# Patient Record
Sex: MALE | Race: WHITE | HISPANIC OR LATINO | Employment: FULL TIME | URBAN - METROPOLITAN AREA
[De-identification: names, ages, dates, MRNs, and addresses within clinical notes are randomized per-mention and may not be internally consistent; named-entity substitution may affect disease eponyms.]

---

## 2017-02-14 ENCOUNTER — NON-PROVIDER VISIT (OUTPATIENT)
Dept: OCCUPATIONAL MEDICINE | Facility: CLINIC | Age: 27
End: 2017-02-14
Payer: COMMERCIAL

## 2017-02-14 ENCOUNTER — HOSPITAL ENCOUNTER (EMERGENCY)
Facility: MEDICAL CENTER | Age: 27
End: 2017-02-14
Attending: EMERGENCY MEDICINE
Payer: COMMERCIAL

## 2017-02-14 ENCOUNTER — APPOINTMENT (OUTPATIENT)
Dept: RADIOLOGY | Facility: MEDICAL CENTER | Age: 27
End: 2017-02-14
Attending: EMERGENCY MEDICINE
Payer: COMMERCIAL

## 2017-02-14 VITALS
WEIGHT: 181.88 LBS | BODY MASS INDEX: 27.57 KG/M2 | TEMPERATURE: 98.2 F | DIASTOLIC BLOOD PRESSURE: 90 MMHG | HEART RATE: 75 BPM | OXYGEN SATURATION: 98 % | HEIGHT: 68 IN | RESPIRATION RATE: 18 BRPM | SYSTOLIC BLOOD PRESSURE: 135 MMHG

## 2017-02-14 DIAGNOSIS — Z02.83 ENCOUNTER FOR DRUG SCREENING: ICD-10-CM

## 2017-02-14 DIAGNOSIS — S62.635A CLOSED DISPLACED FRACTURE OF DISTAL PHALANX OF LEFT RING FINGER, INITIAL ENCOUNTER: ICD-10-CM

## 2017-02-14 DIAGNOSIS — Z02.1 PRE-EMPLOYMENT DRUG SCREENING: ICD-10-CM

## 2017-02-14 PROCEDURE — 73140 X-RAY EXAM OF FINGER(S): CPT | Mod: LT

## 2017-02-14 PROCEDURE — 99026 IN-HOSPITAL ON CALL SERVICE: CPT | Performed by: INTERNAL MEDICINE

## 2017-02-14 PROCEDURE — 99283 EMERGENCY DEPT VISIT LOW MDM: CPT

## 2017-02-14 PROCEDURE — 700102 HCHG RX REV CODE 250 W/ 637 OVERRIDE(OP): Performed by: EMERGENCY MEDICINE

## 2017-02-14 PROCEDURE — A9270 NON-COVERED ITEM OR SERVICE: HCPCS | Performed by: EMERGENCY MEDICINE

## 2017-02-14 PROCEDURE — 80305 DRUG TEST PRSMV DIR OPT OBS: CPT | Performed by: INTERNAL MEDICINE

## 2017-02-14 RX ORDER — OXYCODONE AND ACETAMINOPHEN 10; 325 MG/1; MG/1
1 TABLET ORAL EVERY 4 HOURS PRN
Qty: 20 TAB | Refills: 0 | Status: SHIPPED | OUTPATIENT
Start: 2017-02-14 | End: 2019-06-23

## 2017-02-14 RX ORDER — OXYCODONE AND ACETAMINOPHEN 10; 325 MG/1; MG/1
1 TABLET ORAL ONCE
Status: COMPLETED | OUTPATIENT
Start: 2017-02-14 | End: 2017-02-14

## 2017-02-14 RX ADMIN — OXYCODONE HYDROCHLORIDE AND ACETAMINOPHEN 1 TABLET: 10; 325 TABLET ORAL at 17:39

## 2017-02-14 ASSESSMENT — PAIN SCALES - WONG BAKER: WONGBAKER_NUMERICALRESPONSE: HURTS EVEN MORE

## 2017-02-14 ASSESSMENT — PAIN SCALES - GENERAL: PAINLEVEL_OUTOF10: 6

## 2017-02-14 NOTE — ED AVS SNAPSHOT
Home Care Instructions                                                                                                                Abdulkadir Jorge   MRN: [id number]    Department:  [facility], Emergency Dept   Date of Visit:  2/14/2017            [facility], Emergency Dept    [address]    [place] [address]    Phone:  [phone]      You were seen by     Guy G Gansert, M.D.      Your Diagnosis Was     Closed displaced fracture of distal phalanx of left ring finger, initial encounter     S62.635A       These are the medications you received during your hospitalization from 02/14/2017 1629 to 02/14/2017 1817     Date/Time Order Dose Route Action    02/14/2017 1739 oxycodone-acetaminophen (PERCOCET-10)  MG per tablet 1 Tab 1 Tab Oral Given      Follow-up Information     1. Follow up with [facility] In 1 day.    Contact information    [address] [place] [address] [phone]          2. Follow up with Josh Cardenas M.D..    Specialty:  Orthopaedics    Contact information    [address]  [address]  [place] [address] [phone]        Medication Information     Review all of your home medications and newly ordered medications with your primary doctor and/or pharmacist as soon as possible. Follow medication instructions as directed by your doctor and/or pharmacist.     Please keep your complete medication list with you and share with your physician. Update the information when medications are discontinued, doses are changed, or new medications (including over-the-counter products) are added; and carry medication information at all times in the event of emergency situations.               Medication List      START taking these medications        Instructions    oxycodone-acetaminophen  MG Tabs   Commonly known as:  PERCOCET-10    Take 1 Tab by mouth every four hours as needed for Severe Pain (pain).   Dose:  1 Tab                 Procedures and tests performed during your visit     DX-FINGER(S) 2+ LEFT        Discharge Instructions       Finger Fracture  Fractures of fingers are breaks in the bones of the fingers. There are many types of fractures. There are different ways of treating these fractures. Your health care provider will discuss the best way to treat your fracture.  CAUSES  Traumatic injury is the main cause of broken fingers. These include:  · Injuries while playing sports.  · Workplace injuries.  · Falls.  RISK FACTORS  Activities that can increase your risk of finger fractures include:  · Sports.  · Workplace activities that involve machinery.  · A condition called osteoporosis, which can make your bones less dense and cause them to fracture more easily.  SIGNS AND SYMPTOMS  The main symptoms of a broken finger are pain and swelling within 15 minutes after the injury. Other symptoms include:  · Bruising of your finger.  · Stiffness of your finger.  · Numbness of your finger.  · Exposed bones (compound fracture) if the fracture is severe.  DIAGNOSIS   The best way to diagnose a broken bone is with X-ray imaging. Additionally, your health care provider will use this X-ray image to evaluate the position of the broken finger bones.   TREATMENT   Finger fractures can be treated with:   · Nonreduction--This means the bones are in place. The finger is splinted without changing the positions of the bone pieces. The splint is usually left on for about a week to 10 days. This will depend on your fracture and what your health care provider thinks.  · Closed reduction--The bones are put back into position without using surgery. The finger is then splinted.  · Open reduction and internal fixation--The fracture site is opened. Then the bone pieces are fixed into place with pins or some type of hardware. This is seldom required. It depends on the severity of the fracture.  HOME CARE INSTRUCTIONS   · Follow your health care  provider's instructions regarding activities, exercises, and physical therapy.  · Only take over-the-counter or prescription medicines for pain, discomfort, or fever as directed by your health care provider.  SEEK MEDICAL CARE IF:  You have pain or swelling that limits the motion or use of your fingers.  SEEK IMMEDIATE MEDICAL CARE IF:   Your finger becomes numb.  MAKE SURE YOU:   · Understand these instructions.  · Will watch your condition.  · Will get help right away if you are not doing well or get worse.     This information is not intended to replace advice given to you by your health care provider. Make sure you discuss any questions you have with your health care provider.     Document Released: 04/01/2002 Document Revised: 10/08/2014 Document Reviewed: 07/30/2014  [employer] Interactive Patient Education ©2016 [employer]            Patient Information     Patient Information    Following emergency treatment: all patient requiring follow-up care must return either to a private physician or a clinic if your condition worsens before you are able to obtain further medical attention, please return to the emergency room.     Billing Information    At [facility], we work to make the billing process streamlined for our patients.  Our Representatives are here to answer any questions you may have regarding your hospital bill.  If you have insurance coverage and have supplied your insurance information to us, we will submit a claim to your insurer on your behalf.  Should you have any questions regarding your bill, we can be reached online or by phone as follows:  Online: You are able pay your bills online or live chat with our representatives about any billing questions you may have. We are here to help Monday - Friday from 8:00am to 7:30pm and 9:00am - 12:00pm on Saturdays.  Please visit [email or web address]  for more information.   Phone:  [phone] or [phone]    Please  note that your emergency physician, surgeon, pathologist, radiologist, anesthesiologist, and other specialists are not employed by [facility] and will therefore bill separately for their services.  Please contact them directly for any questions concerning their bills at the numbers below:     Emergency Physician Services:  [phone]  [facility]:  [phone]  [facility]:  [phone]  [facility]:  [phone]    1. Your final bill may vary from the amount quoted upon discharge if all procedures are not complete at that time, or if your doctor has additional procedures of which we are not aware. You will receive an additional bill if you return to the Emergency Department at [facility] for suture removal regardless of the facility of which the sutures were placed.     2. Please arrange for settlement of this account at the emergency registration.    3. All self-pay accounts are due in full at the time of treatment.  If you are unable to meet this obligation then payment is expected within 4-5 days.     4. If you have had radiology studies (CT, X-ray, Ultrasound, MRI), you have received a preliminary result during your emergency department visit. Please contact the radiology department [phone] to receive a copy of your final result. Please discuss the Final result with your primary physician or with the follow up physician provided.     Crisis Hotline:  [place] Crisis Hotline:  2-123-FTEMRCQ or 1-596.561.1601  [place] Crisis Hotline:    1-662.250.9005 or 830-195-6211         ED Discharge Follow Up Questions    1. In order to provide you with very good care, we would like to follow up with a phone call in the next few days.  May we have your permission to contact you?     YES /  NO    2. What is the best phone number to call you? (       )_____-__________    3. What is the best time to call you?      Morning  /  Afternoon  /  Evening                    Patient Signature:  ____________________________________________________________    Date:  ____________________________________________________________

## 2017-02-14 NOTE — LETTER
"  FORM C-4:  EMPLOYEE’S CLAIM FOR COMPENSATION/ REPORT OF INITIAL TREATMENT  EMPLOYEE’S CLAIM - PROVIDE ALL INFORMATION REQUESTED   First Name  Abdulkadir Last Name  Luisito Birthdate             Age  1990 27 y.o. Sex  male Claim Number   Home Employee Address  [address]  [place]                                     Zip  [address] Height  1.727 m (5' 8\") Weight  82.5 kg (181 lb 14.1 oz) [facility]     Mailing Employee Address                           [address]   [place]               Zip  [address] Telephone  [phone] (home)  Primary Language Spoken  ENGLISH   Insurer  N/L Third Party   [employer] Employee's Occupation (Job Title) When Injury or Occupational Disease Occurred     Employer's Name  [employer] Telephone  [phone]    Employer Address  [address] [address] [facility] [facility] [address]] Zip  [address]   Date of Injury  2/14/2017       Hour of Injury  2:00 PM Date Employer Notified  2/14/2017 Last Day of Work after Injury or Occupational Disease  2/14/2017 Supervisor to Whom Injury Reported  Clint   Address or Location of Accident (if applicable)  [[employer] [facility]]   What were you doing at the time of accident? (if applicable)  nailing wood    How did this injury or occupational disease occur? Be specific and answer in detail. Use additional sheet if necessary)  I was nailing a hold down. i tried to nail the hammer slopped with the nail. I hit my finger   If you believe that you have an occupational disease, when did you first have knowledge of the disability and it relationship to your employment?  n/a Witnesses to the Accident  Tyrell Pearson     Nature of Injury or Occupational Disease  Contusion  Part(s) of Body Injured or Affected  Hand (L), N/A, N/A    I certify that the above is true and correct to the best of my knowledge and that I have provided this information in order " to obtain the benefits of [place]  I hereby authorize any physician, chiropractor, surgeon, practitioner, or other person, any hospital, including [facility] or [facility] hospital, any medical service organization, any insurance company, or other institution or organization to release to each other, any medical or other information, including benefits paid or payable, pertinent to this injury or disease, except information relative to diagnosis, treatment and/or counseling for AIDS, psychological conditions, alcohol or controlled substances, for which I must give specific authorization.  A Photostat of this authorization shall be as valid as the original.   Date  February 14,2017 Place  [facility] Employee’s Signature   THIS REPORT MUST BE COMPLETED AND MAILED WITHIN 3 WORKING DAYS OF TREATMENT   Place  [facility], EMERGENCY DEPT  Name of Facility   [facility]   Date  2/14/2017 Diagnosis  (S62.635A) Closed displaced fracture of distal phalanx of left ring finger, initial encounter Is there evidence the injured employee was under the influence of alcohol and/or another controlled substance at the time of accident?   Hour  6:06 PM Description of Injury or Disease  Closed displaced fracture of distal phalanx of left ring finger, initial encounter No   Treatment  1.  Percocet 10 mg #20  2.  Finger splint  Have you advised the patient to remain off work five days or more?         No   X-Ray Findings  Positive   If Yes   From Date    To Date      From information given by the employee, together with medical evidence, can you directly connect this injury or occupational disease as job incurred?  Yes If No, is the employee capable of: Full Duty  No Modified Duty  Yes   Is additional medical care by a physician indicated?  Yes If Modified Duty,  "Specify any Limitations / Restrictions  Light duty ×5 days     Do you know of any previous injury or disease contributing to this condition or occupational disease?  No   Date  2/14/2017 Print Doctor’s Name  Gansert, Guy G I certify the employer’s copy of this form was mailed on:   Address  [address] [place] [address] [phone] Insurer’s Use Only   [facility]  [address]    Provider’s Tax ID Number  [id number] Telephone  Dept: [phone]    Doctor’s Signature  e-SignGANSERT, GUY G M.D. Degree  MD    Original - TREATING PHYSICIAN OR CHIROPRACTOR   Pg 2-Insurer/TPA   Pg 3-Employer   Pg 4-Employee                                                                                                  Form C-4 (rev01/03)     BRIEF DESCRIPTION OF RIGHTS AND BENEFITS  (Pursuant to [place] 616C.050)    Notice of Injury or Occupational Disease (Incident Report Form C-1): If an injury or occupational disease (OD) arises out of and in the course of employment, you must provide written notice to your employer as soon as practicable, but no later than 7 days after the accident or OD. Your employer shall maintain a sufficient supply of the required forms.    Claim for Compensation (Form C-4): If medical treatment is sought, the form C-4 is available at the place of initial treatment. A completed \"Claim for Compensation\" (Form C-4) must be filed within 90 days after an accident or OD. The treating physician or chiropractor must, within 3 working days after treatment, complete and mail to the employer, the employer's insurer and third-party , the Claim for Compensation.    Medical Treatment: If you require medical treatment for your on-the-job injury or OD, you may be required to select a physician or chiropractor from a list provided by your workers’ compensation insurer, if it has contracted with an Organization for Managed Care (MCO) or Preferred Provider Organization (PPO) or providers of " health care. If your employer has not entered into a contract with an MCO or PPO, you may select a physician or chiropractor from the Panel of Physicians and Chiropractors. Any medical costs related to your industrial injury or OD will be paid by your insurer.    Temporary Total Disability (TTD): If your doctor has certified that you are unable to work for a period of at least 5 consecutive days, or 5 cumulative days in a 20-day period, or places restrictions on you that your employer does not accommodate, you may be entitled to TTD compensation.    Temporary Partial Disability (TPD): If the wage you receive upon reemployment is less than the compensation for TTD to which you are entitled, the insurer may be required to pay you TPD compensation to make up the difference. TPD can only be paid for a maximum of 24 months.    Permanent Partial Disability (PPD): When your medical condition is stable and there is an indication of a PPD as a result of your injury or OD, within 30 days, your insurer must arrange for an evaluation by a rating physician or chiropractor to determine the degree of your PPD. The amount of your PPD award depends on the date of injury, the results of the PPD evaluation and your age and wage.    Permanent Total Disability (PTD): If you are medically certified by a treating physician or chiropractor as permanently and totally disabled and have been granted a PTD status by your insurer, you are entitled to receive monthly benefits not to exceed 66 2/3% of your average monthly wage. The amount of your PTD payments is subject to reduction if you previously received a PPD award.    Vocational Rehabilitation Services: You may be eligible for vocational rehabilitation services if you are unable to return to the job due to a permanent physical impairment or permanent restrictions as a result of your injury or occupational disease.    Transportation and Per Lindy Reimbursement: You may be eligible for travel  expenses and per linda associated with medical treatment.  Reopening: You may be able to reopen your claim if your condition worsens after claim closure.    Appeal Process: If you disagree with a written determination issued by the insurer or the insurer does not respond to your request, you may appeal to the Department of Administration, , by following the instructions contained in your determination letter. You must appeal the determination within 70 days from the date of the determination letter at [address], [address], [place], [place] [address], or [address], [address], [place], [place] [address]. If you disagree with the  decision, you may appeal to the Department of Administration, . You must file your appeal within 30 days from the date of the  decision letter at [address], [address], [place], [place] [address], or [address], [address], [place], [place] [address]. If you disagree with a decision of an , you may file a petition for judicial review with the District Court. You must do so within 30 days of the Appeal Officer’s decision. You may be represented by an  at your own expense or you may contact the [facility] for possible representation.    [place]  for Injured Workers (NAIW): If you disagree with a  decision, you may request that NAIW represent you without charge at an  Hearing. For information regarding denial of benefits, you may contact the [facility] at: [address], [address], [place] [address], [phone], or [address], [address], [place] [address], [phone]    To File a Complaint with the Division: If you wish to file a complaint with the  of the Division of Industrial Relations (DIR), please contact the Workers’ Compensation Section, [address], [address], [place], [place] [address],  telephone ([phone]) [phone], or [address], [address], [place], [place] [address], telephone [phone].    For assistance with Workers’ Compensation Issues: you may contact the Office of the Governor Consumer Health Assistance, [address] [place] [address], [address], [place], [place] [address], Toll Free 1-276.162.8293, Web site: [email or web address]., E-mail [email or web address]                                                                                                                                                                               __________________________________________________________________                                    February 14,2017            Employee Name / Signature                                                                                                                            Date                                       D-2 (rev. 10/07)

## 2017-02-14 NOTE — ED AVS SNAPSHOT
Pixalate Access Code: [email or web address]  Expires: 3/16/2017  6:17 PM    Pixalate  A secure, online tool to manage your health information     [employer]’s Pixalate® is a secure, online tool that connects you to your personalized health information from the privacy of your home -- day or night - making it very easy for you to manage your healthcare. Once the activation process is completed, you can even access your medical information using the Pixalate john, which is available for free in the Apple John store or Google Play store.     Pixalate provides the following levels of access (as shown below):   My Chart Features   [facility] Primary Care Doctor [facility]  Specialists [facility]  Urgent  Care Non-[facility]  Primary Care  Doctor   Email your healthcare team securely and privately 24/7 X X X X   Manage appointments: schedule your next appointment; view details of past/upcoming appointments X      Request prescription refills. X      View recent personal medical records, including lab and immunizations X X X X   View health record, including health history, allergies, medications X X X X   Read reports about your outpatient visits, procedures, consult and ER notes X X X X   See your discharge summary, which is a recap of your hospital and/or ER visit that includes your diagnosis, lab results, and care plan. X X       How to register for Pixalate:  1. Go to  [email or web address].  2. Click on the Sign Up Now box, which takes you to the New Member Sign Up page. You will need to provide the following information:  a. Enter your Pixalate Access Code exactly as it appears at the top of this page. (You will not need to use this code after you’ve completed the sign-up process. If you do not sign up before the expiration date, you must request a new code.)   b. Enter your date of birth.   c. Enter your home email address.   d. Click Submit, and follow the next screen’s instructions.  3. Create a Pixalate ID. This will be your Pixalate  login ID and cannot be changed, so think of one that is secure and easy to remember.  4. Create a [employer] Exchange password. You can change your password at any time.  5. Enter your Password Reset Question and Answer. This can be used at a later time if you forget your password.   6. Enter your e-mail address. This allows you to receive e-mail notifications when new information is available in Science Exchange.  7. Click Sign Up. You can now view your health information.    For assistance activating your Science Exchange account, call [phone]

## 2017-02-14 NOTE — MR AVS SNAPSHOT
Abdulkadir Luisito   2017 11:10 AM   Appointment   MRN: [id number]    Department:  [facility]   Dept Phone:  [phone]    Description:  Male : 1990   Provider:  [place] NON RENOWN MA           Allergies as of 2017     No Known Allergies      Vital Signs     Smoking Status                   Never Smoker            Basic Information     Date Of Birth Sex Race Ethnicity Preferred Language    1990 Male White  Origin (Ghanaian,Burkinan,Dutch,Reece, etc) English      Health Maintenance     Patient has no pending health maintenance at this time      Current Immunizations     No immunizations on file.      Below and/or attached are the medications your provider expects you to take. Review all of your home medications and newly ordered medications with your provider and/or pharmacist. Follow medication instructions as directed by your provider and/or pharmacist. Please keep your medication list with you and share with your provider. Update the information when medications are discontinued, doses are changed, or new medications (including over-the-counter products) are added; and carry medication information at all times in the event of emergency situations     Allergies:  No Known Allergies          Medications  Valid as of: February 15, 2017 -  8:18 AM    Generic Name Brand Name Tablet Size Instructions for use    Oxycodone-Acetaminophen (Tab) PERCOCET-10  MG Take 1 Tab by mouth every four hours as needed for Severe Pain (pain).        .                 Medicines prescribed today were sent to:     None      Medication refill instructions:       If your prescription bottle indicates you have medication refills left, it is not necessary to call your provider’s office. Please contact your pharmacy and they will refill your medication.    If your prescription bottle indicates you do not have any refills left, you may request refills at any time through one of the following  ways: The online [employer] system (except Urgent Care), by calling your provider’s office, or by asking your pharmacy to contact your provider’s office with a refill request. Medication refills are processed only during regular business hours and may not be available until the next business day. Your provider may request additional information or to have a follow-up visit with you prior to refilling your medication.   *Please Note: Medication refills are assigned a new Rx number when refilled electronically. Your pharmacy may indicate that no refills were authorized even though a new prescription for the same medication is available at the pharmacy. Please request the medicine by name with the pharmacy before contacting your provider for a refill.           [employer] Access Code: [email or web address]  Expires: 3/16/2017  6:17 PM    [employer]  A secure, online tool to manage your health information     [employer]s [employer]® is a secure, online tool that connects you to your personalized health information from the privacy of your home -- day or night - making it very easy for you to manage your healthcare. Once the activation process is completed, you can even access your medical information using the [employer] john, which is available for free in the Apple John store or Google Play store.     [employer] provides the following levels of access (as shown below):   My Chart Features   [facility] Doctor [facility]  Urgent  Care Non-[facility]  Primary Care  Doctor   Email your healthcare team securely and privately 24/7 X X X    Manage appointments: schedule your next appointment; view details of past/upcoming appointments X      Request prescription refills. X      View recent personal medical records, including lab and immunizations X X X X   View health record, including health history, allergies, medications X X X X   Read reports about your outpatient visits, procedures, consult and ER notes X X X X   See  your discharge summary, which is a recap of your hospital and/or ER visit that includes your diagnosis, lab results, and care plan. X X       How to register for [employer]:  1. Go to  [email or web address].  2. Click on the Sign Up Now box, which takes you to the New Member Sign Up page. You will need to provide the following information:  a. Enter your [employer] Access Code exactly as it appears at the top of this page. (You will not need to use this code after you’ve completed the sign-up process. If you do not sign up before the expiration date, you must request a new code.)   b. Enter your date of birth.   c. Enter your home email address.   d. Click Submit, and follow the next screen’s instructions.  3. Create a Chaologix ID. This will be your [employer] login ID and cannot be changed, so think of one that is secure and easy to remember.  4. Create a [employer] password. You can change your password at any time.  5. Enter your Password Reset Question and Answer. This can be used at a later time if you forget your password.   6. Enter your e-mail address. This allows you to receive e-mail notifications when new information is available in Chaologix.  7. Click Sign Up. You can now view your health information.    For assistance activating your [employer] account, call [phone]

## 2017-02-14 NOTE — ED AVS SNAPSHOT
2/14/2017          Abdulkadir HutchinsonLindsey  [address]  [place] [address]    Dear Abdulkadir:    [facility] wants to ensure your discharge home is safe and you or your loved ones have had all your questions answered regarding your care after you leave the hospital.    You may receive a telephone call within two days of your discharge.  This call is to make certain you understand your discharge instructions as well as ensure we provided you with the best care possible during your stay with us.     The call will only last approximately 3-5 minutes and will be done by a nurse.    Once again, we want to ensure your discharge home is safe and that you have a clear understanding of any next steps in your care.  If you have any questions or concerns, please do not hesitate to contact us, we are here for you.  Thank you for choosing [facility] for your healthcare needs.    Sincerely,    Rocky Koenig    [facility]

## 2017-02-15 LAB
AMP AMPHETAMINE: NORMAL
COC COCAINE: NORMAL
INT CON NEG: NORMAL
INT CON POS: NORMAL
MET METHAMPHETAMINES: NORMAL
OPI OPIATES: NORMAL
PCP PHENCYCLIDINE: NORMAL
POC DRUG COMMENT 753798-OCCUPATIONAL HEALTH: NEGATIVE
THC: NORMAL

## 2017-02-15 NOTE — ED PROVIDER NOTES
"ED Provider Note    CHIEF COMPLAINT  Chief Complaint   Patient presents with   • Hand Injury       HPI  Abdulkadir Jorge is a 27 y.o. male who presents for evaluation injury to his left ring finger.  The patient was working approximately 3 hours ago when he struck it with a hammer.  The patient complains of pain to the distal aspect of the left 4th digit/ring finger.  He states he has mild abrasion on his 3rd finger but denies any pain associated with this.  No recent illness.  No other acute symptomatology or complaints.    REVIEW OF SYSTEMS  See HPI for further details.  No history of: Cardiopulmonary disorders, gastrointestinal disorders, diabetes;    PAST MEDICAL HISTORY  History reviewed. No pertinent past medical history.    FAMILY HISTORY  History reviewed. No pertinent family history.    SOCIAL HISTORY  Nonsmoker; occasional alcohol use; the injury occurred while working;    SURGICAL HISTORY  History reviewed. No pertinent past surgical history.    CURRENT MEDICATIONS  See nurses notes    ALLERGIES  Allergies not on file    PHYSICAL EXAM  VITAL SIGNS: Ht 1.727 m (5' 8\")  Wt 82.5 kg (181 lb 14.1 oz)  BMI 27.66 kg/m2   Constitutional: Well developed, Well nourished, No acute distress, Non-toxic appearance.   HENT: Normocephalic, Atraumatic  Cardiovascular: Regular rate and rhythm without mumurs, gallups, rubs   Thorax & Lungs: Normal Equal breath sounds, No respiratory distress, No wheezing, no stridor, no rales. No chest tenderness.   Musculoskeletal: Left hand/4th digit: Patient has swelling and ecchymosis to the pad surface only scant subungual hematoma identified; the extensor and flexor tendon mechanisms are intact; motor, sensory, vascular intact distally  Neurologic: Alert & oriented x 3,  No gross focal deficits noted.     RADIOLOGY/PROCEDURES  DX-FINGER(S) 2+ LEFT   Final Result      Acute comminuted distal tuft fracture of the distal phalanx of the fourth digit.          COURSE & MEDICAL DECISION " MAKING  Pertinent Labs & Imaging studies reviewed. (See chart for details)  Discussion: At this time, the patient has findings consistent with distal phalangeal tuft fracture.  The patient was placed in a finger splint.  Patient is to follow-up at the [facility] occupational clinic tomorrow.  I have filled out a C4 form.  He is also be given orthopedic surgery follow-up.    FINAL IMPRESSION  1. Closed displaced fracture of distal phalanx of left ring finger, initial encounter         PLAN  1.  Appropriate discharge instructions given  2.  Percocet 10 mg #20  3.  Follow up for further evaluation with occupational medicine and orthopedic surgery    Electronically signed by: Guy G Gansert, 2/14/2017 5:05 PM

## 2017-02-15 NOTE — PROGRESS NOTES
Norma COBOS was called out during business hours to ER for a post Accident UDS  on 2/14/17 for [employer].    UDS collected at 5:08pm.

## 2017-02-15 NOTE — ED NOTES
Pt bib self with c/o of hand injury. Pt was at work when he accidentally his his left ring finger with a hammer. Pt states he still has feeling. Denies numbness or tingling. Denies interventions at home.

## 2017-02-15 NOTE — ED NOTES
Pt received d/c instr; pt verbalized understanding. Pt instr not to drive self; pt verbalized understanding. Pt amb to lobby s/p d/c

## 2017-02-15 NOTE — DISCHARGE INSTRUCTIONS
Finger Fracture  Fractures of fingers are breaks in the bones of the fingers. There are many types of fractures. There are different ways of treating these fractures. Your health care provider will discuss the best way to treat your fracture.  CAUSES  Traumatic injury is the main cause of broken fingers. These include:  · Injuries while playing sports.  · Workplace injuries.  · Falls.  RISK FACTORS  Activities that can increase your risk of finger fractures include:  · Sports.  · Workplace activities that involve machinery.  · A condition called osteoporosis, which can make your bones less dense and cause them to fracture more easily.  SIGNS AND SYMPTOMS  The main symptoms of a broken finger are pain and swelling within 15 minutes after the injury. Other symptoms include:  · Bruising of your finger.  · Stiffness of your finger.  · Numbness of your finger.  · Exposed bones (compound fracture) if the fracture is severe.  DIAGNOSIS   The best way to diagnose a broken bone is with X-ray imaging. Additionally, your health care provider will use this X-ray image to evaluate the position of the broken finger bones.   TREATMENT   Finger fractures can be treated with:   · Nonreduction--This means the bones are in place. The finger is splinted without changing the positions of the bone pieces. The splint is usually left on for about a week to 10 days. This will depend on your fracture and what your health care provider thinks.  · Closed reduction--The bones are put back into position without using surgery. The finger is then splinted.  · Open reduction and internal fixation--The fracture site is opened. Then the bone pieces are fixed into place with pins or some type of hardware. This is seldom required. It depends on the severity of the fracture.  HOME CARE INSTRUCTIONS   · Follow your health care provider's instructions regarding activities, exercises, and physical therapy.  · Only take over-the-counter or prescription  medicines for pain, discomfort, or fever as directed by your health care provider.  SEEK MEDICAL CARE IF:  You have pain or swelling that limits the motion or use of your fingers.  SEEK IMMEDIATE MEDICAL CARE IF:   Your finger becomes numb.  MAKE SURE YOU:   · Understand these instructions.  · Will watch your condition.  · Will get help right away if you are not doing well or get worse.     This information is not intended to replace advice given to you by your health care provider. Make sure you discuss any questions you have with your health care provider.     Document Released: 04/01/2002 Document Revised: 10/08/2014 Document Reviewed: 07/30/2014  [employer]TC.[employer] Interactive Patient Education ©2016 [employer] Inc.

## 2017-02-16 ENCOUNTER — OCCUPATIONAL MEDICINE (OUTPATIENT)
Dept: OCCUPATIONAL MEDICINE | Facility: CLINIC | Age: 27
End: 2017-02-16
Payer: COMMERCIAL

## 2017-02-16 VITALS
SYSTOLIC BLOOD PRESSURE: 148 MMHG | BODY MASS INDEX: 26.52 KG/M2 | RESPIRATION RATE: 14 BRPM | DIASTOLIC BLOOD PRESSURE: 92 MMHG | WEIGHT: 175 LBS | HEIGHT: 68 IN | OXYGEN SATURATION: 100 % | TEMPERATURE: 98.8 F | HEART RATE: 99 BPM

## 2017-02-16 DIAGNOSIS — S62.635A CLOSED DISPLACED FRACTURE OF DISTAL PHALANX OF LEFT RING FINGER, INITIAL ENCOUNTER: ICD-10-CM

## 2017-02-16 PROCEDURE — 99204 OFFICE O/P NEW MOD 45 MIN: CPT | Performed by: PREVENTIVE MEDICINE

## 2017-02-16 ASSESSMENT — PAIN SCALES - GENERAL: PAINLEVEL: 7=MODERATE-SEVERE PAIN

## 2017-02-16 NOTE — PROGRESS NOTES
"Subjective:      Abdulkadir Jorge is a 27 y.o. male who presents with Follow-Up      DOI 2/14/2017: The patient was working when he accidentally struck his left ring finger with the hammer. Seen in ED, XR showed \"Acute comminuted distal tuft fracture of the distal phalanx of the fourth digit.\" Given percocet. Patient states that pain has been improving. He states his only been taking half a Percocet and just not taken that often. He has been using aluminum finger splint that is given. He denies any numbness or tingling.     HPI    ROS    PMH:  has no past medical history on file.  MEDS:   Current outpatient prescriptions:   •  oxycodone-acetaminophen (PERCOCET-10)  MG Tab, Take 1 Tab by mouth every four hours as needed for Severe Pain (pain)., Disp: 20 Tab, Rfl: 0  ALLERGIES:   Allergies   Allergen Reactions   • Sulfa Drugs Unspecified     Pt doesn't remember the reaction     SURGHX: No past surgical history on file.  SOCHX:  reports that he has never smoked. He does not have any smokeless tobacco history on file. He reports that he drinks alcohol. He reports that he does not use illicit drugs.  FH: family history is not on file.     Objective:     /92 mmHg  Pulse 99  Temp(Src) 37.1 °C (98.8 °F)  Resp 14  Ht 1.727 m (5' 8\")  Wt 79.379 kg (175 lb)  BMI 26.61 kg/m2  SpO2 100%     Physical Exam   Constitutional: He appears well-developed and well-nourished.   Cardiovascular: Normal rate.    Pulmonary/Chest: Effort normal.   Skin: Skin is warm and dry.   Psychiatric: He has a normal mood and affect. His behavior is normal.       Left ring finger: Subungual hematoma covers about 33% of nail. Mild swelling of the distal phalanx. Tenderness palpation throughout the distal phalanx. Decreased DIP flexion. Sensation grossly intact       Assessment/Plan:     1. Closed displaced fracture of distal phalanx of left ring finger, initial encounter  Continue aluminum finger splint  Recommend taking ibuprofen or " Tylenol and reserving Percocet for severe pain  Restricted duty  Follow-up 10 days

## 2017-02-16 NOTE — MR AVS SNAPSHOT
"Abdulkadir Jorge   2017 1:40 PM   Occupational Medicine   MRN: [id number]    Department:  [facility]   Dept Phone:  [phone]    Description:  Male : 1990   Provider:  Edd Bello D.O.           Reason for Visit     Follow-Up  DOI 2017 - Finger - Better - Pro. Room 1      Allergies as of 2017     Allergen Noted Reactions    Sulfa Drugs 2017   Unspecified    Pt doesn't remember the reaction      You were diagnosed with     Closed displaced fracture of distal phalanx of left ring finger, initial encounter   [[id number]]         Vital Signs     Blood Pressure Pulse Temperature Respirations Height Weight    148/92 mmHg 99 37.1 °C (98.8 °F) 14 1.727 m (5' 8\") 79.379 kg (175 lb)    Body Mass Index Oxygen Saturation Smoking Status             26.61 kg/m2 100% Never Smoker          Basic Information     Date Of Birth Sex Race Ethnicity Preferred Language    1990 Male White  Origin (Yoruba,East Timorese,Nigerian,Reece, etc) English      Health Maintenance     Patient has no pending health maintenance at this time      Current Immunizations     No immunizations on file.      Below and/or attached are the medications your provider expects you to take. Review all of your home medications and newly ordered medications with your provider and/or pharmacist. Follow medication instructions as directed by your provider and/or pharmacist. Please keep your medication list with you and share with your provider. Update the information when medications are discontinued, doses are changed, or new medications (including over-the-counter products) are added; and carry medication information at all times in the event of emergency situations     Allergies:  SULFA DRUGS - Unspecified               Medications  Valid as of: 2017 -  2:00 PM    Generic Name Brand Name Tablet Size Instructions for use    Oxycodone-Acetaminophen (Tab) PERCOCET-10  MG Take 1 Tab by mouth " every four hours as needed for Severe Pain (pain).        .                 Medicines prescribed today were sent to:     [id number] - [place], [place] - [address] [place] [address] [address] [place] [address]    Phone: [phone] Fax: [phone]    Open 24 Hours?: No      Medication refill instructions:       If your prescription bottle indicates you have medication refills left, it is not necessary to call your provider’s office. Please contact your pharmacy and they will refill your medication.    If your prescription bottle indicates you do not have any refills left, you may request refills at any time through one of the following ways: The online [employer] system (except Urgent Care), by calling your provider’s office, or by asking your pharmacy to contact your provider’s office with a refill request. Medication refills are processed only during regular business hours and may not be available until the next business day. Your provider may request additional information or to have a follow-up visit with you prior to refilling your medication.   *Please Note: Medication refills are assigned a new Rx number when refilled electronically. Your pharmacy may indicate that no refills were authorized even though a new prescription for the same medication is available at the pharmacy. Please request the medicine by name with the pharmacy before contacting your provider for a refill.           [employer] Access Code: [email or web address]  Expires: 3/16/2017  6:17 PM    [employer]  A secure, online tool to manage your health information     [employer]’s [employer]® is a secure, online tool that connects you to your personalized health information from the privacy of your home -- day or night - making it very easy for you to manage your healthcare. Once the activation process is completed, you can even access your medical information using the [employer]omming john, which is available for free in the Apple John  store or Google Play store.     [email or web address] provides the following levels of access (as shown below):   My Chart Features   [facility] Primary Care Doctor [facility]  Specialists [facility]  Urgent  Care Non-[facility]  Primary Care  Doctor   Email your healthcare team securely and privately 24/7 X X X    Manage appointments: schedule your next appointment; view details of past/upcoming appointments X      Request prescription refills. X      View recent personal medical records, including lab and immunizations X X X X   View health record, including health history, allergies, medications X X X X   Read reports about your outpatient visits, procedures, consult and ER notes X X X X   See your discharge summary, which is a recap of your hospital and/or ER visit that includes your diagnosis, lab results, and care plan. X X       How to register for [email or web address]:  1. Go to  [email or web address].  2. Click on the Sign Up Now box, which takes you to the New Member Sign Up page. You will need to provide the following information:  a. Enter your [email or web address]COM Access Code exactly as it appears at the top of this page. (You will not need to use this code after you’ve completed the sign-up process. If you do not sign up before the expiration date, you must request a new code.)   b. Enter your date of birth.   c. Enter your home email address.   d. Click Submit, and follow the next screen’s instructions.  3. Create a [email or web address].COM ID. This will be your [email or web address].COM login ID and cannot be changed, so think of one that is secure and easy to remember.  4. Create a MODIZY.COM password. You can change your password at any time.  5. Enter your Password Reset Question and Answer. This can be used at a later time if you forget your password.   6. Enter your e-mail address. This allows you to receive e-mail notifications when new information is available in [email or web address].  7. Click Sign Up. You can now view your health information.    For assistance activating your [employer]  account, call [phone]

## 2017-02-16 NOTE — Clinical Note
"   [address],   [address] [place] [address]  Phone: [phone] - Fax: [phone]        [facility] Progress Report and Disability Certification  Date of Service: 2/16/2017   No Show:  No  Date / Time of Next Visit: 2/28/2017 @11:10 AM   Claim Information   Patient Name: Abdulkadir Jorge  Claim Number:     Employer: [email or web address]  Date of Injury: 2/14/2017     Insurer / TPA: [employer]  ID / SSN:     Occupation:   Diagnosis: The encounter diagnosis was Closed displaced fracture of distal phalanx of left ring finger, initial encounter.    Medical Information   Related to Industrial Injury? Yes    Subjective Complaints:  DOI 2/14/2017: The patient was working when he accidentally struck his left ring finger with the hammer. Seen in ED, XR showed \"Acute comminuted distal tuft fracture of the distal phalanx of the fourth digit.\" Given percocet. Patient states that pain has been improving. He states his only been taking half a Percocet and just not taken that often. He has been using aluminum finger splint that is given. He denies any numbness or tingling.   Objective Findings: Left ring finger: Subungual hematoma covers about 33% of nail. Mild swelling of the distal phalanx. Tenderness palpation throughout the distal phalanx. Decreased DIP flexion. Sensation grossly intact   Pre-Existing Condition(s):     Assessment:   Condition Improved    Status: Additional Care Required  Permanent Disability:No    Plan:      Diagnostics:      Comments:  Continue aluminum finger splint  Recommend taking ibuprofen or Tylenol and reserving Percocet for severe pain  Restricted duty  Follow-up 10 days    Disability Information   Status: Released to Restricted Duty    From:  2/16/2017  Through: 2/28/2017 Restrictions are: Temporary   Physical Restrictions   Sitting:    Standing:    Stooping:    Bending:      Squatting:    Walking:  " Climbing:    Pushing:      Pulling:    Other:    Reaching Above Shoulder (L):   Reaching Above Shoulder (R):       Reaching Below Shoulder (L):    Reaching Below Shoulder (R):      Not to exceed Weight Limits   Carrying(hrs):   Weight Limit(lb):   Lifting(hrs):   Weight  Limit(lb):     Comments: No forceful gripping, grasping or pinching with left hand. Limit push/pull/lifting to less than 5 pounds with left hand.    Repetitive Actions   Hands: i.e. Fine Manipulations from Grasping:     Feet: i.e. Operating Foot Controls:     Driving / Operate Machinery:     Physician Name: Edd Bello D.O. Physician Signature: EDD Sahni D.O. e-Signature: Dr. Harjit Dukes, Medical Director   Clinic Name / Location: [address],   [address] [place] [address] Clinic Phone Number: Dept: [phone]   Appointment Time: 1:40 Pm Visit Start Time: 1:44 PM   Check-In Time:  1:38 Pm Visit Discharge Time: 2:15 PM   Original-Treating Physician or Chiropractor    Page 2-Insurer/TPA    Page 3-Employer    Page 4-Employee

## 2019-06-23 ENCOUNTER — HOSPITAL ENCOUNTER (EMERGENCY)
Facility: MEDICAL CENTER | Age: 29
End: 2019-06-23
Attending: EMERGENCY MEDICINE
Payer: COMMERCIAL

## 2019-06-23 VITALS
TEMPERATURE: 97.7 F | HEIGHT: 68 IN | DIASTOLIC BLOOD PRESSURE: 75 MMHG | OXYGEN SATURATION: 99 % | SYSTOLIC BLOOD PRESSURE: 132 MMHG | BODY MASS INDEX: 26.03 KG/M2 | HEART RATE: 55 BPM | WEIGHT: 171.74 LBS | RESPIRATION RATE: 18 BRPM

## 2019-06-23 DIAGNOSIS — G43.009 MIGRAINE WITHOUT AURA AND WITHOUT STATUS MIGRAINOSUS, NOT INTRACTABLE: ICD-10-CM

## 2019-06-23 DIAGNOSIS — R20.2 PARESTHESIAS: ICD-10-CM

## 2019-06-23 LAB
ALBUMIN SERPL BCP-MCNC: 4.4 G/DL (ref 3.2–4.9)
ALBUMIN/GLOB SERPL: 1.6 G/DL
ALP SERPL-CCNC: 121 U/L (ref 30–99)
ALT SERPL-CCNC: 19 U/L (ref 2–50)
ANION GAP SERPL CALC-SCNC: 7 MMOL/L (ref 0–11.9)
AST SERPL-CCNC: 16 U/L (ref 12–45)
BASOPHILS # BLD AUTO: 0.7 % (ref 0–1.8)
BASOPHILS # BLD: 0.05 K/UL (ref 0–0.12)
BILIRUB SERPL-MCNC: 0.5 MG/DL (ref 0.1–1.5)
BUN SERPL-MCNC: 14 MG/DL (ref 8–22)
CALCIUM SERPL-MCNC: 9.3 MG/DL (ref 8.5–10.5)
CHLORIDE SERPL-SCNC: 106 MMOL/L (ref 96–112)
CO2 SERPL-SCNC: 24 MMOL/L (ref 20–33)
CREAT SERPL-MCNC: 0.96 MG/DL (ref 0.5–1.4)
EOSINOPHIL # BLD AUTO: 0.17 K/UL (ref 0–0.51)
EOSINOPHIL NFR BLD: 2.4 % (ref 0–6.9)
ERYTHROCYTE [DISTWIDTH] IN BLOOD BY AUTOMATED COUNT: 43 FL (ref 35.9–50)
GLOBULIN SER CALC-MCNC: 2.7 G/DL (ref 1.9–3.5)
GLUCOSE SERPL-MCNC: 97 MG/DL (ref 65–99)
HCT VFR BLD AUTO: 45.4 % (ref 42–52)
HGB BLD-MCNC: 15.3 G/DL (ref 14–18)
IMM GRANULOCYTES # BLD AUTO: 0.02 K/UL (ref 0–0.11)
IMM GRANULOCYTES NFR BLD AUTO: 0.3 % (ref 0–0.9)
LYMPHOCYTES # BLD AUTO: 2.45 K/UL (ref 1–4.8)
LYMPHOCYTES NFR BLD: 33.9 % (ref 22–41)
MCH RBC QN AUTO: 31 PG (ref 27–33)
MCHC RBC AUTO-ENTMCNC: 33.7 G/DL (ref 33.7–35.3)
MCV RBC AUTO: 92.1 FL (ref 81.4–97.8)
MONOCYTES # BLD AUTO: 0.54 K/UL (ref 0–0.85)
MONOCYTES NFR BLD AUTO: 7.5 % (ref 0–13.4)
NEUTROPHILS # BLD AUTO: 3.99 K/UL (ref 1.82–7.42)
NEUTROPHILS NFR BLD: 55.2 % (ref 44–72)
NRBC # BLD AUTO: 0 K/UL
NRBC BLD-RTO: 0 /100 WBC
PLATELET # BLD AUTO: 294 K/UL (ref 164–446)
PMV BLD AUTO: 9 FL (ref 9–12.9)
POTASSIUM SERPL-SCNC: 3.8 MMOL/L (ref 3.6–5.5)
PROT SERPL-MCNC: 7.1 G/DL (ref 6–8.2)
RBC # BLD AUTO: 4.93 M/UL (ref 4.7–6.1)
SODIUM SERPL-SCNC: 137 MMOL/L (ref 135–145)
WBC # BLD AUTO: 7.2 K/UL (ref 4.8–10.8)

## 2019-06-23 PROCEDURE — 85025 COMPLETE CBC W/AUTO DIFF WBC: CPT

## 2019-06-23 PROCEDURE — 99284 EMERGENCY DEPT VISIT MOD MDM: CPT

## 2019-06-23 PROCEDURE — 80053 COMPREHEN METABOLIC PANEL: CPT

## 2019-06-23 PROCEDURE — 96374 THER/PROPH/DIAG INJ IV PUSH: CPT

## 2019-06-23 PROCEDURE — 700111 HCHG RX REV CODE 636 W/ 250 OVERRIDE (IP): Performed by: EMERGENCY MEDICINE

## 2019-06-23 PROCEDURE — 96375 TX/PRO/DX INJ NEW DRUG ADDON: CPT

## 2019-06-23 PROCEDURE — 700105 HCHG RX REV CODE 258: Performed by: EMERGENCY MEDICINE

## 2019-06-23 RX ORDER — DIPHENHYDRAMINE HYDROCHLORIDE 50 MG/ML
25 INJECTION INTRAMUSCULAR; INTRAVENOUS EVERY 6 HOURS PRN
Status: DISCONTINUED | OUTPATIENT
Start: 2019-06-23 | End: 2019-06-23

## 2019-06-23 RX ORDER — KETOROLAC TROMETHAMINE 30 MG/ML
30 INJECTION, SOLUTION INTRAMUSCULAR; INTRAVENOUS ONCE
Status: COMPLETED | OUTPATIENT
Start: 2019-06-23 | End: 2019-06-23

## 2019-06-23 RX ORDER — DIPHENHYDRAMINE HYDROCHLORIDE 50 MG/ML
25 INJECTION INTRAMUSCULAR; INTRAVENOUS ONCE
Status: COMPLETED | OUTPATIENT
Start: 2019-06-23 | End: 2019-06-23

## 2019-06-23 RX ORDER — METOCLOPRAMIDE HYDROCHLORIDE 5 MG/ML
10 INJECTION INTRAMUSCULAR; INTRAVENOUS ONCE
Status: COMPLETED | OUTPATIENT
Start: 2019-06-23 | End: 2019-06-23

## 2019-06-23 RX ORDER — ONDANSETRON 2 MG/ML
4 INJECTION INTRAMUSCULAR; INTRAVENOUS ONCE
Status: COMPLETED | OUTPATIENT
Start: 2019-06-23 | End: 2019-06-23

## 2019-06-23 RX ORDER — SODIUM CHLORIDE 9 MG/ML
1000 INJECTION, SOLUTION INTRAVENOUS ONCE
Status: COMPLETED | OUTPATIENT
Start: 2019-06-23 | End: 2019-06-23

## 2019-06-23 RX ADMIN — ONDANSETRON 4 MG: 2 INJECTION INTRAMUSCULAR; INTRAVENOUS at 20:04

## 2019-06-23 RX ADMIN — METOCLOPRAMIDE 10 MG: 5 INJECTION, SOLUTION INTRAMUSCULAR; INTRAVENOUS at 20:02

## 2019-06-23 RX ADMIN — KETOROLAC TROMETHAMINE 30 MG: 30 INJECTION, SOLUTION INTRAMUSCULAR at 20:03

## 2019-06-23 RX ADMIN — SODIUM CHLORIDE 1000 ML: 9 INJECTION, SOLUTION INTRAVENOUS at 20:02

## 2019-06-23 RX ADMIN — DIPHENHYDRAMINE HYDROCHLORIDE 25 MG: 50 INJECTION INTRAMUSCULAR; INTRAVENOUS at 20:40

## 2019-06-24 NOTE — ED NOTES
Pt ambulatory to rm 65. Placed in gown, in [facility], on monitor, call light in reach, educated on plan for care.

## 2019-06-24 NOTE — ED NOTES
Pt discharged home. Explained discharge instructions. Questions and comments addressed. Pt verbalized understanding of instructions. Wristband removed. Pt advised to follow-up with PCP or return to ED for any new or worsening of symptoms. Pt is ambulating well and steady on feet. VS stable. FAMILY [facility] will be escorting pt home.     PIV removed and dressing applied  Pt verbalized understanding of medication instructions.

## 2019-06-24 NOTE — ED PROVIDER NOTES
ED Provider Note    Scribed for Guzman Busch M.D. by Donte Jones. 6/23/2019, 7:33 PM.    Primary care provider: Pcp Pt States None  Means of arrival: Walk In  History obtained from: Patient  History limited by: None    CHIEF COMPLAINT  Chief Complaint   Patient presents with   • Numbness     left sided facial numbness. started this morning around 1030.         LELO Jorge is a 29 y.o. male who presents to the Emergency Department for evaluation of constant left sided facial numbness onset this morning at 10:30 AM. The patient reports that his numbness started about 5 minutes after eating a sandwich while he was talking with his wife. He notes that the numbness started in his left cheek and began to spread to his lips, nose, and neck. He endorses experiencing associated left sided facial pain, left arm numbness, left sided dental pain, headache, and nausea. His left arm numbness has gone away on its own. He states that he took Benadryl, Advil, and aspirin for his symptoms as well as a nap, which provided some alleviation of his symptoms. His headache is exacerbated with bright light and loud sounds.  Per patient, he does not have any history of migraines but his mother regularly has migraines. He denies alcohol use and drug use. He also denies experiencing weakness, aphasia, vision changes, and vomiting.     REVIEW OF SYSTEMS  Pertinent positives include left sided facial numbness, left sided facial pain, left arm numbness, left sided dental pain, headache, and nausea. Pertinent negatives include no alcohol use, drug use, weakness, aphasia, vision changes, and vomiting. All other systems negative.    PAST MEDICAL HISTORY   No history of migraines.     SURGICAL HISTORY  No previous neurologic surgeries.     SOCIAL HISTORY  Social History   Substance Use Topics   • Smoking status: Never Smoker   • Smokeless tobacco: Never Used   • Alcohol use Yes      Comment: socially      History   Drug  "Use No       FAMILY HISTORY  History reviewed. No pertinent family history.    CURRENT MEDICATIONS  Home Medications     Reviewed by Guzman Aldana R.N. (Registered Nurse) on 06/23/19 at 1912  Med List Status: Complete   Medication Last Dose Status        Patient Ryan Taking any Medications                       ALLERGIES  Allergies   Allergen Reactions   • Sulfa Drugs Unspecified     Pt doesn't remember the reaction       PHYSICAL EXAM  VITAL SIGNS: /75   Pulse (!) 59   Temp 36.5 °C (97.7 °F) (Temporal)   Resp 16   Ht 1.727 m (5' 8\")   Wt 77.9 kg (171 lb 11.8 oz)   SpO2 98%   BMI 26.11 kg/m²     Constitutional: Well developed, Well nourished, mild distress.   HENT: Normocephalic, Atraumatic, Oropharynx moist.   Eyes: Conjunctiva normal, No discharge.   Cardiovascular: Normal heart rate, Normal rhythm, No murmurs, equal pulses.   Pulmonary: Normal breath sounds, No respiratory distress, No wheezing, No rales, No rhonchi.  Chest: No chest wall tenderness or deformity.   Abdomen:Soft, No tenderness, No masses, no rebound, no guarding.   Back: No CVA tenderness.   Musculoskeletal: No major deformities noted, No tenderness.   Skin: Warm, Dry, No erythema, No rash.   Neurologic: Alert & oriented x 3, Normal motor function,  No focal deficits noted. Normal finger to nose, Normal cranial nerves II-XII, No pronator drift. Equal strength in upper and lower extremities bilaterally.  Psychiatric: Affect normal, Judgment normal, Mood normal.     LABS  Results for orders placed or performed during the hospital encounter of 06/23/19   CBC WITH DIFFERENTIAL   Result Value Ref Range    WBC 7.2 4.8 - 10.8 K/uL    RBC 4.93 4.70 - 6.10 M/uL    Hemoglobin 15.3 14.0 - 18.0 g/dL    Hematocrit 45.4 42.0 - 52.0 %    MCV 92.1 81.4 - 97.8 fL    MCH 31.0 27.0 - 33.0 pg    MCHC 33.7 33.7 - 35.3 g/dL    RDW 43.0 35.9 - 50.0 fL    Platelet Count 294 164 - 446 K/uL    MPV 9.0 9.0 - 12.9 fL    Neutrophils-Polys 55.20 44.00 - " 72.00 %    Lymphocytes 33.90 22.00 - 41.00 %    Monocytes 7.50 0.00 - 13.40 %    Eosinophils 2.40 0.00 - 6.90 %    Basophils 0.70 0.00 - 1.80 %    Immature Granulocytes 0.30 0.00 - 0.90 %    Nucleated RBC 0.00 /100 WBC    Neutrophils (Absolute) 3.99 1.82 - 7.42 K/uL    Lymphs (Absolute) 2.45 1.00 - 4.80 K/uL    Monos (Absolute) 0.54 0.00 - 0.85 K/uL    Eos (Absolute) 0.17 0.00 - 0.51 K/uL    Baso (Absolute) 0.05 0.00 - 0.12 K/uL    Immature Granulocytes (abs) 0.02 0.00 - 0.11 K/uL    NRBC (Absolute) 0.00 K/uL   COMP METABOLIC PANEL   Result Value Ref Range    Sodium 137 135 - 145 mmol/L    Potassium 3.8 3.6 - 5.5 mmol/L    Chloride 106 96 - 112 mmol/L    Co2 24 20 - 33 mmol/L    Anion Gap 7.0 0.0 - 11.9    Glucose 97 65 - 99 mg/dL    Bun 14 8 - 22 mg/dL    Creatinine 0.96 0.50 - 1.40 mg/dL    Calcium 9.3 8.5 - 10.5 mg/dL    AST(SGOT) 16 12 - 45 U/L    ALT(SGPT) 19 2 - 50 U/L    Alkaline Phosphatase 121 (H) 30 - 99 U/L    Total Bilirubin 0.5 0.1 - 1.5 mg/dL    Albumin 4.4 3.2 - 4.9 g/dL    Total Protein 7.1 6.0 - 8.2 g/dL    Globulin 2.7 1.9 - 3.5 g/dL    A-G Ratio 1.6 g/dL   ESTIMATED GFR   Result Value Ref Range    GFR If African American >60 >60 mL/min/1.73 m 2    GFR If Non African American >60 >60 mL/min/1.73 m 2       All labs reviewed by me.    COURSE & MEDICAL DECISION MAKING  Pertinent Labs & Imaging studies reviewed. (See chart for details)    7:33 PM - Patient seen and examined at bedside. Patient will be treated with Zofran 4 mg, Reglan 10 mg, Toradol 30 mg, and NS infusion 1000 mL. The patient will receive IV fluids for part of the migraine cocktail as PO fluids have not been adequate alone. Ordered CBC, CMP, and Estimated GFR to evaluate his symptoms. I informed the patient that we will treat his symptoms like a migraine and if it does not alleviate his symptoms, we will potentially do an MRI for further evaluation of his symptoms. He understood and agreed to the plan of care. Differential diagnoses  include but are not limited to: Migraine, ischemic stroke, and dehydration.     8:38 PM Patient reevaluated at bedside. He reported that his numbness and headache was completely gone secondary to IV fluid and medication administration but stated that he was feeling like he had to move around significantly. He will be treated with Benadryl 25 mg as he has a ride home and will be stable for discharge after interventions. I advised him to follow up with his doctor and to return to the ED if any new or worsening symptoms arise. He understood and agreed to the plan of care.     Medical Decision Making: At this point time I think patient most likely had a complex migraine causing some facial numbness.  He was light sensitive as well as sound sensitive.  His headache and numbness of gone away with migraine cocktail.  Patient feels much better.  I do not think this is a stroke given the fact the patient symptoms of a resolved completely with migraine cocktail.    The patient will return for new or worsening symptoms and is stable at the time of discharge.    The patient is referred to a primary physician for blood pressure management, diabetic screening, and for all other preventative health concerns.    DISPOSITION:  Patient will be discharged home in stable condition.    FOLLOW UP:  Your doctor    Schedule an appointment as soon as possible for a visit in 1 week      [address] [address] [phone]    If you need a doctor    [address] [address] [phone]    If you need a doctor      FINAL IMPRESSION  1. Paresthesias    2. Migraine without aura and without status migrainosus, not intractable          Donte DE JESUS (Lawanda), am scribing for, and in the presence of, Guzman Busch M.D.    Electronically signed by: Dnote Jones (Lawanda), 6/23/2019    Guzman DE JESUS M.D. personally performed  the services described in this documentation, as scribed by Donte Jones in my presence, and it is both accurate and complete. C.     The note accurately reflects work and decisions made by me.  Guzman Busch  6/24/2019  2:41 AM

## 2019-06-24 NOTE — DISCHARGE INSTRUCTIONS
Return if you have new or different headache, confusion, weakness, difficulty speaking, facial droop, or new numbness.

## 2019-06-24 NOTE — ED TRIAGE NOTES
Abdulkadir Jorge  29 y.o.  Chief Complaint   Patient presents with   • Numbness     left sided facial numbness. started this morning around 1030.       Patient ambulatory with steady gait to triage room with above complaint.  Patient appears in minor discomfort.  States also has some pain in the left side of his face as well.  No facial droop noted.      Patient escorted to the lobby and instructed to notify staff of any changes in condition.

## 2023-06-23 ENCOUNTER — OFFICE VISIT (OUTPATIENT)
Dept: URGENT CARE | Facility: CLINIC | Age: 33
End: 2023-06-23
Payer: COMMERCIAL

## 2023-06-23 VITALS
WEIGHT: 185 LBS | HEART RATE: 76 BPM | DIASTOLIC BLOOD PRESSURE: 90 MMHG | RESPIRATION RATE: 16 BRPM | BODY MASS INDEX: 29.03 KG/M2 | TEMPERATURE: 97.7 F | SYSTOLIC BLOOD PRESSURE: 130 MMHG | OXYGEN SATURATION: 98 % | HEIGHT: 67 IN

## 2023-06-23 DIAGNOSIS — B96.89 ACUTE BACTERIAL RHINOSINUSITIS: ICD-10-CM

## 2023-06-23 DIAGNOSIS — J01.90 ACUTE BACTERIAL RHINOSINUSITIS: ICD-10-CM

## 2023-06-23 PROCEDURE — 99213 OFFICE O/P EST LOW 20 MIN: CPT

## 2023-06-23 PROCEDURE — 3075F SYST BP GE 130 - 139MM HG: CPT

## 2023-06-23 PROCEDURE — 3080F DIAST BP >= 90 MM HG: CPT

## 2023-06-23 RX ORDER — AMOXICILLIN AND CLAVULANATE POTASSIUM 875; 125 MG/1; MG/1
1 TABLET, FILM COATED ORAL 2 TIMES DAILY
Qty: 14 TABLET | Refills: 0 | Status: SHIPPED | OUTPATIENT
Start: 2023-06-23 | End: 2023-06-30

## 2023-06-23 RX ORDER — FLUTICASONE PROPIONATE 50 MCG
1 SPRAY, SUSPENSION (ML) NASAL DAILY
Qty: 16 G | Refills: 0 | Status: SHIPPED | OUTPATIENT
Start: 2023-06-23 | End: 2023-12-14

## 2023-06-23 ASSESSMENT — ENCOUNTER SYMPTOMS
SINUS PRESSURE: 1
WEIGHT LOSS: 0
NECK PAIN: 0
SWOLLEN GLANDS: 0
HOARSE VOICE: 0
CHILLS: 0
WHEEZING: 0
SORE THROAT: 0
SPUTUM PRODUCTION: 0
FEVER: 0
SHORTNESS OF BREATH: 0
COUGH: 0
DIAPHORESIS: 0
HEADACHES: 0
HEMOPTYSIS: 0
SINUS PAIN: 0

## 2023-06-23 NOTE — PROGRESS NOTES
Subjective:     Abdulkadir Jorge is a 33 y.o. male who presents for Sinus Problem (X 3 days, nasal congestion, stuffy nose, face pressure, numbness on Lt. Side of face)      Sinus Problem  This is a new problem. Episode onset: three days ago he developed sinus pressure. He had two days where he felt better. Woke up this morning and felt significantly worse. The problem has been gradually worsening since onset. There has been no fever. The pain is severe. Associated symptoms include congestion, ear pain and sinus pressure. Pertinent negatives include no chills, coughing, diaphoresis, headaches, hoarse voice, neck pain, shortness of breath, sneezing, sore throat or swollen glands. (Teeth pain, gums numb, LS facial pressure, LS ear pain. ) Past treatments include acetaminophen. The treatment provided no relief.       Review of Systems   Constitutional:  Negative for chills, diaphoresis, fever, malaise/fatigue and weight loss.   HENT:  Positive for congestion, ear pain and sinus pressure. Negative for ear discharge, hoarse voice, sinus pain, sneezing and sore throat.    Respiratory:  Negative for cough, hemoptysis, sputum production, shortness of breath and wheezing.    Musculoskeletal:  Negative for neck pain.   Neurological:  Negative for headaches.   All other systems reviewed and are negative.      PMH: No past medical history on file.  ALLERGIES:   Allergies   Allergen Reactions    Sulfa Drugs Unspecified     Pt doesn't remember the reaction     SURGHX: No past surgical history on file.  SOCHX:   Social History     Socioeconomic History    Marital status:    Tobacco Use    Smoking status: Never    Smokeless tobacco: Never   Substance and Sexual Activity    Alcohol use: Yes     Comment: socially    Drug use: No     FH: No family history on file.      Objective:   BP (!) 130/90 (BP Location: Left arm, Patient Position: Sitting, BP Cuff Size: Large adult)   Pulse 76   Temp 36.5 °C (97.7 °F) (Temporal)    "Resp 16   Ht 1.702 m (5' 7\")   Wt 83.9 kg (185 lb)   SpO2 98%   BMI 28.98 kg/m²     Physical Exam  Vitals reviewed.   Constitutional:       General: He is not in acute distress.     Appearance: Normal appearance. He is not ill-appearing, toxic-appearing or diaphoretic.   HENT:      Head: Normocephalic and atraumatic.      Right Ear: Ear canal and external ear normal. No middle ear effusion. No mastoid tenderness. Tympanic membrane is not bulging.      Left Ear: Tympanic membrane, ear canal and external ear normal.  No middle ear effusion. No mastoid tenderness. Tympanic membrane is not bulging.      Nose: Congestion present. No rhinorrhea.      Right Sinus: No maxillary sinus tenderness or frontal sinus tenderness.      Left Sinus: Frontal sinus tenderness present. No maxillary sinus tenderness.      Mouth/Throat:      Mouth: Mucous membranes are moist.      Pharynx: Oropharynx is clear. No posterior oropharyngeal erythema.   Eyes:      Extraocular Movements: Extraocular movements intact.      Conjunctiva/sclera: Conjunctivae normal.      Pupils: Pupils are equal, round, and reactive to light.   Cardiovascular:      Rate and Rhythm: Normal rate and regular rhythm.      Pulses: Normal pulses.      Heart sounds: Normal heart sounds.   Pulmonary:      Effort: Pulmonary effort is normal.      Breath sounds: Normal breath sounds.   Abdominal:      General: Abdomen is flat.      Palpations: Abdomen is soft.   Musculoskeletal:         General: Normal range of motion.      Cervical back: Normal range of motion and neck supple. No rigidity or tenderness.   Lymphadenopathy:      Cervical: No cervical adenopathy.   Skin:     General: Skin is warm and dry.   Neurological:      General: No focal deficit present.      Mental Status: He is alert and oriented to person, place, and time. Mental status is at baseline.   Psychiatric:         Mood and Affect: Mood normal.         Behavior: Behavior normal.         Thought Content: " Thought content normal.         Judgment: Judgment normal.       Assessment/Plan:   Assessment      1. Acute bacterial rhinosinusitis  - amoxicillin-clavulanate (AUGMENTIN) 875-125 MG Tab; Take 1 Tablet by mouth 2 times a day for 7 days.  Dispense: 14 Tablet; Refill: 0  - fluticasone (FLONASE) 50 MCG/ACT nasal spray; Administer 1 Spray into affected nostril(S) every day.  Dispense: 16 g; Refill: 0     Prescription for Augmentin sent to patient's preferred pharmacy for the treatment of acute bacterial rhinosinusitis.  Supportive care discussed with patient including the use of fluticasone, OTC antihistamine, Tylenol/ibuprofen as needed for discomfort, coolmist humidification, increasing hydration.  Patient to monitor for symptom resolution.  If symptoms worsen or fail to improve despite antibiotic and supportive measures, patient educated to present back to the urgent care in 3 to 5 days for evaluation. All questions answered. Patient verbalized understanding and is in agreement with this plan of care.     Differential diagnosis, natural history, and supportive care discussed. AVS handout given and reviewed with patient. Patient educated on red flags and when to seek treatment back in ED or UC.     I personally reviewed prior external notes and test results pertinent to today's visit.  I have independently reviewed and interpreted all diagnostics ordered during this urgent care visit.     This dictation has been created using voice recognition software. The accuracy of the dictation is limited by the abilities of the software. I expect there may be some errors of grammar and possibly content. I made every attempt to manually correct the errors within my dictation. However, errors related to voice recognition software may still exist and should be interpreted within the appropriate context.    This note was electronically signed by ROSIBEL Escalona

## 2023-12-14 ENCOUNTER — HOSPITAL ENCOUNTER (EMERGENCY)
Facility: MEDICAL CENTER | Age: 33
End: 2023-12-14
Attending: EMERGENCY MEDICINE
Payer: COMMERCIAL

## 2023-12-14 VITALS
HEART RATE: 69 BPM | SYSTOLIC BLOOD PRESSURE: 132 MMHG | DIASTOLIC BLOOD PRESSURE: 85 MMHG | WEIGHT: 195.33 LBS | TEMPERATURE: 97.4 F | OXYGEN SATURATION: 99 % | RESPIRATION RATE: 15 BRPM | BODY MASS INDEX: 30.59 KG/M2

## 2023-12-14 DIAGNOSIS — M54.42 ACUTE LEFT-SIDED LOW BACK PAIN WITH LEFT-SIDED SCIATICA: ICD-10-CM
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
## 2023-12-14 DIAGNOSIS — S39.012A STRAIN OF LUMBAR REGION, INITIAL ENCOUNTER: ICD-10-CM

## 2023-12-14 PROCEDURE — 700102 HCHG RX REV CODE 250 W/ 637 OVERRIDE(OP): Performed by: EMERGENCY MEDICINE

## 2023-12-14 PROCEDURE — A9270 NON-COVERED ITEM OR SERVICE: HCPCS | Performed by: EMERGENCY MEDICINE

## 2023-12-14 PROCEDURE — 99284 EMERGENCY DEPT VISIT MOD MDM: CPT

## 2023-12-14 RX ORDER — METHYLPREDNISOLONE 4 MG/1
4 TABLET ORAL ONCE
Status: COMPLETED | OUTPATIENT
Start: 2023-12-14 | End: 2023-12-14

## 2023-12-14 RX ORDER — NAPROXEN 500 MG/1
500 TABLET ORAL ONCE
Status: COMPLETED | OUTPATIENT
Start: 2023-12-14 | End: 2023-12-14

## 2023-12-14 RX ORDER — METHYLPREDNISOLONE 4 MG/1
TABLET ORAL
Qty: 21 TABLET | Refills: 0 | Status: SHIPPED | OUTPATIENT
Start: 2023-12-14

## 2023-12-14 RX ORDER — METHOCARBAMOL 750 MG/1
750 TABLET, FILM COATED ORAL 3 TIMES DAILY
Qty: 30 TABLET | Refills: 0 | Status: SHIPPED | OUTPATIENT
Start: 2023-12-14 | End: 2023-12-24

## 2023-12-14 RX ORDER — METHOCARBAMOL 500 MG/1
1000 TABLET, FILM COATED ORAL ONCE
Status: COMPLETED | OUTPATIENT
Start: 2023-12-14 | End: 2023-12-14

## 2023-12-14 RX ORDER — NAPROXEN 500 MG/1
500 TABLET ORAL 2 TIMES DAILY WITH MEALS
Qty: 20 TABLET | Refills: 0 | Status: SHIPPED | OUTPATIENT
Start: 2023-12-14 | End: 2023-12-24

## 2023-12-14 RX ADMIN — NAPROXEN 500 MG: 500 TABLET ORAL at 11:01

## 2023-12-14 RX ADMIN — METHYLPREDNISOLONE 4 MG: 4 TABLET ORAL at 11:01

## 2023-12-14 RX ADMIN — METHOCARBAMOL 1000 MG: 500 TABLET ORAL at 11:01

## 2023-12-14 ASSESSMENT — PAIN DESCRIPTION - PAIN TYPE: TYPE: ACUTE PAIN
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
## 2024-05-21 ENCOUNTER — OFFICE VISIT (OUTPATIENT)
Dept: URGENT CARE | Facility: PHYSICIAN GROUP | Age: 34
End: 2024-05-21
Payer: COMMERCIAL

## 2024-05-21 VITALS
BODY MASS INDEX: 27.4 KG/M2 | HEIGHT: 69 IN | HEART RATE: 79 BPM | DIASTOLIC BLOOD PRESSURE: 82 MMHG | SYSTOLIC BLOOD PRESSURE: 124 MMHG | OXYGEN SATURATION: 97 % | TEMPERATURE: 99.2 F | RESPIRATION RATE: 17 BRPM | WEIGHT: 185 LBS

## 2024-05-21 DIAGNOSIS — S61.411A LACERATION OF RIGHT HAND WITHOUT FOREIGN BODY, INITIAL ENCOUNTER: ICD-10-CM

## 2024-05-21 PROCEDURE — 12002 RPR S/N/AX/GEN/TRNK2.6-7.5CM: CPT

## 2024-05-21 PROCEDURE — 3079F DIAST BP 80-89 MM HG: CPT

## 2024-05-21 PROCEDURE — 3074F SYST BP LT 130 MM HG: CPT

## 2024-05-21 ASSESSMENT — ENCOUNTER SYMPTOMS
SORE THROAT: 0
ABDOMINAL PAIN: 0
COUGH: 0
HEADACHES: 0
SHORTNESS OF BREATH: 0
NAUSEA: 0
FEVER: 0
VOMITING: 0
CHILLS: 0
MYALGIAS: 0
DIARRHEA: 0

## 2024-05-22 NOTE — PROGRESS NOTES
"Subjective:   Abdulkadir Jorge is a 34 y.o. male who presents for Laceration (Incident happened today, CUT right hand on palm )      Laceration   The incident occurred 1 to 3 hours ago. The laceration is located on the Right hand. The laceration is 3 cm in size. The laceration mechanism was a metal edge. The pain is mild. He reports no foreign bodies present. His tetanus status is UTD.       Review of Systems   Constitutional:  Negative for chills, fever and malaise/fatigue.   HENT:  Negative for congestion, ear pain, hearing loss and sore throat.    Respiratory:  Negative for cough and shortness of breath.    Cardiovascular:  Negative for chest pain.   Gastrointestinal:  Negative for abdominal pain, diarrhea, nausea and vomiting.   Genitourinary:  Negative for dysuria.   Musculoskeletal:  Negative for myalgias.   Skin:  Negative for rash.   Neurological:  Negative for headaches.       Medications, Allergies, and current problem list reviewed today in Epic.     Objective:     /82 (BP Location: Left arm, Patient Position: Sitting, BP Cuff Size: Adult)   Pulse 79   Temp 37.3 °C (99.2 °F) (Temporal)   Resp 17   Ht 1.753 m (5' 9\")   Wt 83.9 kg (185 lb)   SpO2 97%     Physical Exam  Vitals reviewed.   Constitutional:       General: He is not in acute distress.     Appearance: Normal appearance. He is not ill-appearing.   HENT:      Head: Normocephalic and atraumatic.      Right Ear: Tympanic membrane normal.      Left Ear: Tympanic membrane normal.      Nose: Nose normal.      Mouth/Throat:      Mouth: Mucous membranes are moist.      Pharynx: Oropharynx is clear.   Eyes:      Conjunctiva/sclera: Conjunctivae normal.      Pupils: Pupils are equal, round, and reactive to light.   Cardiovascular:      Rate and Rhythm: Normal rate.      Heart sounds: Normal heart sounds.   Pulmonary:      Effort: Pulmonary effort is normal.      Breath sounds: Normal breath sounds.   Abdominal:      General: Abdomen is flat. "      Palpations: Abdomen is soft.   Skin:     General: Skin is warm and dry.      Capillary Refill: Capillary refill takes less than 2 seconds.      Findings: Laceration present.             Comments: 3 cm full-thickness laceration to right palm at the base of right thumb   Neurological:      Mental Status: He is alert and oriented to person, place, and time.   Psychiatric:         Mood and Affect: Mood normal.         Behavior: Behavior normal.     Procedure: Laceration Repair  -Risks including bleeding, nerve damage, infection, and poor cosmetic outcome discussed at length. Benefits and alternatives discussed. Verbal consent was obtained.  -Sterile technique throughout  -Wound copiously irrigated with NS and betadine prep used prior to initiation  -Local anesthesia with 1% lidocaine  -Closed with #6  4-0 Nylon interrupted sutures with good wound approximation  -Polysporin and dressing placed  -Patient tolerated well with no complications or blood loss  -Wound care discussed  -Signs and symptoms of new/worsening infection discussed at length      Assessment/Plan:       1. Laceration of right hand without foreign body, initial encounter          After ROS and physical exam patient here with complaints of laceration to right palm from piece of metal that happened earlier today.  Patient reports there was a large amount of blood initially but bleeding has been controlled with compressed bandage.  Patient is up-to-date on Tdap and last received when his daughter was born roughly 5 years ago.  Patient has no complaints of numbness or tingling and full range of motion distally to injury.  After assessment patient does have a full-thickness laceration roughly 3 cm in length to palm of right hand at the base of his right thumb.  Patient CMS and sensation are intact distally to injury.  Lack repair was performed and six 4-0 nylon interrupted sutures were placed with good wound approximation.  See note above for further  details.  Patient's wound was bandaged with Polysporin, gauze, and Coban.  Patient instructed not to take off for the next 48 hours.  Patient instructed after that to have the wound open to air and clean with warm soap and water and do not use hydrogen peroxide.  Patient to monitor for signs of infection including redness, swelling, or drainage.  Patient to have sutures removed in the next 7 to 10 days.  Patient to monitor for any worsening signs and symptoms and if any other concerns return to the urgent care or emergency department for further management.    Differential diagnosis, natural history, and supportive care discussed. We also reviewed side effects of medication including allergic response, GI upset, tendon injury, rash, sedation etc. Patient and/or guardian voices understanding.      Advised the patient to follow-up with the primary care physician for recheck, reevaluation, and consideration of further management.    I personally reviewed prior external notes and test results pertinent to today's visit as well as additional imaging and testing completed in clinic today.     Please note that this dictation was created using voice recognition software. I have made every reasonable attempt to correct obvious errors, but I expect that there are errors of grammar and possibly content that I did not discover before finalizing the note.    This note was electronically signed by ROSIBEL Damico

## 2024-12-31 ENCOUNTER — APPOINTMENT (OUTPATIENT)
Dept: RADIOLOGY | Facility: MEDICAL CENTER | Age: 34
End: 2024-12-31
Attending: STUDENT IN AN ORGANIZED HEALTH CARE EDUCATION/TRAINING PROGRAM
Payer: COMMERCIAL

## 2024-12-31 ENCOUNTER — HOSPITAL ENCOUNTER (EMERGENCY)
Facility: MEDICAL CENTER | Age: 34
End: 2024-12-31
Attending: STUDENT IN AN ORGANIZED HEALTH CARE EDUCATION/TRAINING PROGRAM
Payer: COMMERCIAL

## 2024-12-31 ENCOUNTER — OFFICE VISIT (OUTPATIENT)
Dept: URGENT CARE | Facility: CLINIC | Age: 34
End: 2024-12-31
Payer: COMMERCIAL

## 2024-12-31 VITALS
HEIGHT: 69 IN | DIASTOLIC BLOOD PRESSURE: 70 MMHG | OXYGEN SATURATION: 99 % | TEMPERATURE: 98 F | SYSTOLIC BLOOD PRESSURE: 120 MMHG | BODY MASS INDEX: 27.98 KG/M2 | WEIGHT: 188.93 LBS | HEART RATE: 56 BPM | RESPIRATION RATE: 16 BRPM

## 2024-12-31 DIAGNOSIS — R10.31 RIGHT LOWER QUADRANT ABDOMINAL PAIN: ICD-10-CM

## 2024-12-31 DIAGNOSIS — R50.9 FEVER, UNSPECIFIED FEVER CAUSE: ICD-10-CM

## 2024-12-31 DIAGNOSIS — R10.11 ABDOMINAL PAIN, RUQ: ICD-10-CM

## 2024-12-31 LAB
ALBUMIN SERPL BCP-MCNC: 4.6 G/DL (ref 3.2–4.9)
ALBUMIN/GLOB SERPL: 1.4 G/DL
ALP SERPL-CCNC: 97 U/L (ref 30–99)
ALT SERPL-CCNC: 50 U/L (ref 2–50)
ANION GAP SERPL CALC-SCNC: 13 MMOL/L (ref 7–16)
APPEARANCE UR: CLEAR
AST SERPL-CCNC: 25 U/L (ref 12–45)
BASOPHILS # BLD AUTO: 0.7 % (ref 0–1.8)
BASOPHILS # BLD: 0.05 K/UL (ref 0–0.12)
BILIRUB SERPL-MCNC: 0.5 MG/DL (ref 0.1–1.5)
BILIRUB UR QL STRIP.AUTO: NEGATIVE
BUN SERPL-MCNC: 10 MG/DL (ref 8–22)
CALCIUM ALBUM COR SERPL-MCNC: 8.7 MG/DL (ref 8.5–10.5)
CALCIUM SERPL-MCNC: 9.2 MG/DL (ref 8.5–10.5)
CHLORIDE SERPL-SCNC: 102 MMOL/L (ref 96–112)
CO2 SERPL-SCNC: 23 MMOL/L (ref 20–33)
COLOR UR: YELLOW
CREAT SERPL-MCNC: 0.94 MG/DL (ref 0.5–1.4)
EOSINOPHIL # BLD AUTO: 0.15 K/UL (ref 0–0.51)
EOSINOPHIL NFR BLD: 2.1 % (ref 0–6.9)
ERYTHROCYTE [DISTWIDTH] IN BLOOD BY AUTOMATED COUNT: 39.5 FL (ref 35.9–50)
GFR SERPLBLD CREATININE-BSD FMLA CKD-EPI: 108 ML/MIN/1.73 M 2
GLOBULIN SER CALC-MCNC: 3.2 G/DL (ref 1.9–3.5)
GLUCOSE SERPL-MCNC: 93 MG/DL (ref 65–99)
GLUCOSE UR STRIP.AUTO-MCNC: NEGATIVE MG/DL
HCT VFR BLD AUTO: 46.7 % (ref 42–52)
HGB BLD-MCNC: 16 G/DL (ref 14–18)
IMM GRANULOCYTES # BLD AUTO: 0.03 K/UL (ref 0–0.11)
IMM GRANULOCYTES NFR BLD AUTO: 0.4 % (ref 0–0.9)
KETONES UR STRIP.AUTO-MCNC: NEGATIVE MG/DL
LEUKOCYTE ESTERASE UR QL STRIP.AUTO: NEGATIVE
LIPASE SERPL-CCNC: 36 U/L (ref 11–82)
LYMPHOCYTES # BLD AUTO: 2.54 K/UL (ref 1–4.8)
LYMPHOCYTES NFR BLD: 35.5 % (ref 22–41)
MCH RBC QN AUTO: 30.5 PG (ref 27–33)
MCHC RBC AUTO-ENTMCNC: 34.3 G/DL (ref 32.3–36.5)
MCV RBC AUTO: 89 FL (ref 81.4–97.8)
MICRO URNS: NORMAL
MONOCYTES # BLD AUTO: 0.51 K/UL (ref 0–0.85)
MONOCYTES NFR BLD AUTO: 7.1 % (ref 0–13.4)
NEUTROPHILS # BLD AUTO: 3.88 K/UL (ref 1.82–7.42)
NEUTROPHILS NFR BLD: 54.2 % (ref 44–72)
NITRITE UR QL STRIP.AUTO: NEGATIVE
NRBC # BLD AUTO: 0 K/UL
NRBC BLD-RTO: 0 /100 WBC (ref 0–0.2)
PH UR STRIP.AUTO: 6.5 [PH] (ref 5–8)
PLATELET # BLD AUTO: 354 K/UL (ref 164–446)
PMV BLD AUTO: 8.7 FL (ref 9–12.9)
POTASSIUM SERPL-SCNC: 3.9 MMOL/L (ref 3.6–5.5)
PROT SERPL-MCNC: 7.8 G/DL (ref 6–8.2)
PROT UR QL STRIP: NEGATIVE MG/DL
RBC # BLD AUTO: 5.25 M/UL (ref 4.7–6.1)
RBC UR QL AUTO: NEGATIVE
SODIUM SERPL-SCNC: 138 MMOL/L (ref 135–145)
SP GR UR STRIP.AUTO: >1.045
UROBILINOGEN UR STRIP.AUTO-MCNC: 0.2 EU/DL
WBC # BLD AUTO: 7.2 K/UL (ref 4.8–10.8)

## 2024-12-31 PROCEDURE — 83690 ASSAY OF LIPASE: CPT

## 2024-12-31 PROCEDURE — 81003 URINALYSIS AUTO W/O SCOPE: CPT

## 2024-12-31 PROCEDURE — 80053 COMPREHEN METABOLIC PANEL: CPT

## 2024-12-31 PROCEDURE — 36415 COLL VENOUS BLD VENIPUNCTURE: CPT

## 2024-12-31 PROCEDURE — 700117 HCHG RX CONTRAST REV CODE 255: Performed by: STUDENT IN AN ORGANIZED HEALTH CARE EDUCATION/TRAINING PROGRAM

## 2024-12-31 PROCEDURE — 85025 COMPLETE CBC W/AUTO DIFF WBC: CPT

## 2024-12-31 PROCEDURE — 99284 EMERGENCY DEPT VISIT MOD MDM: CPT

## 2024-12-31 PROCEDURE — 74177 CT ABD & PELVIS W/CONTRAST: CPT

## 2024-12-31 RX ORDER — IBUPROFEN 400 MG/1
400 TABLET, FILM COATED ORAL EVERY 6 HOURS PRN
Qty: 30 TABLET | Refills: 0 | Status: SHIPPED | OUTPATIENT
Start: 2024-12-31

## 2024-12-31 RX ORDER — ONDANSETRON 4 MG/1
4 TABLET, ORALLY DISINTEGRATING ORAL EVERY 6 HOURS PRN
Qty: 10 TABLET | Refills: 0 | Status: SHIPPED | OUTPATIENT
Start: 2024-12-31

## 2024-12-31 RX ORDER — ACETAMINOPHEN 325 MG/1
650 TABLET ORAL EVERY 6 HOURS PRN
Qty: 30 TABLET | Refills: 0 | Status: SHIPPED | OUTPATIENT
Start: 2024-12-31

## 2024-12-31 RX ADMIN — IOHEXOL 90 ML: 350 INJECTION, SOLUTION INTRAVENOUS at 20:00

## 2024-12-31 ASSESSMENT — ENCOUNTER SYMPTOMS
CHILLS: 0
COUGH: 0
ANOREXIA: 1
SORE THROAT: 0
VOMITING: 0
SHORTNESS OF BREATH: 0
FEVER: 1
MYALGIAS: 0
NAUSEA: 1
ABDOMINAL PAIN: 1

## 2024-12-31 NOTE — PROGRESS NOTES
"Subjective:   Abdulkadir Jorge is a 34 y.o. male who presents for Abdominal Pain (Right lower side pain ) and Fever (Fever for two days. Taking motrin )        Abdominal Pain  This is a new (Reports abdominal pain, fever over the past 3 days) problem. Episode onset: 3 days, fever past 2 days. The onset quality is gradual. The problem occurs constantly. The problem has been unchanged. The pain is located in the RUQ and RLQ. The pain is moderate. The quality of the pain is aching. Associated symptoms include anorexia (Reports decreased appetite), a fever and nausea. Pertinent negatives include no myalgias or vomiting.   Fever   Associated symptoms include abdominal pain and nausea. Pertinent negatives include no chest pain, coughing, rash, sore throat or vomiting.     PMH:  has no past medical history on file.  MEDS: No current outpatient medications on file.  ALLERGIES:   Allergies   Allergen Reactions    Sulfa Drugs Unspecified     Pt doesn't remember the reaction     SURGHX: History reviewed. No pertinent surgical history.  SOCHX:  reports that he has never smoked. He has never used smokeless tobacco. He reports current alcohol use. He reports that he does not use drugs.  FH: History reviewed. No pertinent family history.  Review of Systems   Constitutional:  Positive for fever. Negative for chills.   HENT:  Negative for sore throat.    Respiratory:  Negative for cough and shortness of breath.    Cardiovascular:  Negative for chest pain.   Gastrointestinal:  Positive for abdominal pain, anorexia (Reports decreased appetite) and nausea. Negative for vomiting.   Musculoskeletal:  Negative for myalgias.   Skin:  Negative for rash.        Objective:   /72 (BP Location: Left arm, Patient Position: Sitting, BP Cuff Size: Adult)   Pulse 68   Temp 36.2 °C (97.2 °F) (Temporal)   Resp 18   Ht 1.753 m (5' 9\")   Wt 46.5 kg (102 lb 9.6 oz)   SpO2 99%   BMI 15.15 kg/m²   Physical Exam  Vitals and nursing note " reviewed.   Constitutional:       General: He is not in acute distress.     Appearance: He is well-developed.   HENT:      Head: Normocephalic and atraumatic.      Right Ear: External ear normal.      Left Ear: External ear normal.      Nose: Nose normal.      Mouth/Throat:      Mouth: Mucous membranes are moist.   Eyes:      Conjunctiva/sclera: Conjunctivae normal.   Cardiovascular:      Rate and Rhythm: Normal rate.   Pulmonary:      Effort: Pulmonary effort is normal. No respiratory distress.      Breath sounds: Normal breath sounds.   Abdominal:      General: There is no distension.      Palpations: There is hepatomegaly.      Tenderness: There is abdominal tenderness in the right upper quadrant and right lower quadrant. There is guarding (Voluntary right lower quadrant). There is no rebound. Positive signs include McBurney's sign. Negative signs include Saunders's sign.   Musculoskeletal:         General: Normal range of motion.   Skin:     General: Skin is warm and dry.   Neurological:      General: No focal deficit present.      Mental Status: He is alert and oriented to person, place, and time. Mental status is at baseline.      Gait: Gait (gait at baseline) normal.   Psychiatric:         Judgment: Judgment normal.           Assessment/Plan:   1. Right lower quadrant abdominal pain    2. Abdominal pain, RUQ    3. Fever, unspecified fever cause        Medical Decision Making/Course:  In the context of the clinical presentation of right lower quadrant abdominal pain with right upper quadrant abdominal pain and fever and recent decreased appetite there is a clinical concern for acute appendicitis and/or acute cholecystitis, choledocholithiasis, bowel obstruction and/or other intra abdominal pelvic pathology accordingly emergency department evaluation management is warranted.  The patient deferred EMS ambulance transfer requested transport by private vehicle and the [facility] emergency  department was advised the transfer center notified.  In the course of preparing for this visit with review of the pertinent past medical history, recent and past clinic visits, current medications, and performing chart, immunization, medical history and medication reconciliation, and in the further course of obtaining the current history pertinent to the clinic visit today, performing an exam and evaluation, ordering and independently evaluating labs, tests  , and/or procedures, prescribing any recommended new medications as noted above, providing any pertinent counseling and education and recommending further coordination of care including recommendations for symptomatic and supportive measures, at least  37 minutes of total time were spent during this encounter.        Please note that this dictation was created using voice recognition software. I have worked with consultants from the vendor as well as technical experts from [employer] [employer] to optimize the interface. I have made every reasonable attempt to correct obvious errors, but I expect that there are errors of grammar and possibly content that I did not discover before finalizing the note.

## 2025-01-01 NOTE — ED PROVIDER NOTES
"ER Provider Note    Scribed for Carlos Park M.d. by Tatianna Silvestre. 12/31/2024  7:13 PM    Primary Care Provider: PCP none noted.     CHIEF COMPLAINT   Chief Complaint   Patient presents with    Abdominal Pain     Right lower x3days    Fever     EXTERNAL RECORDS REVIEWED  External ED Note Patient was seen at urgent care for right      HPI/ROS  LIMITATION TO HISTORY   Select: : None  OUTSIDE HISTORIAN(S):  None     Abdulkadir Lindsey is a 34 y.o. male who presents to the ED complaining of right lower quadrant abdominal pain that started a few days ago. Patient's pain is a 4/10 and does not radiate to his back or chest. He admits to having a fever and has been taking Ibuprofen for his symptoms. Patient denies vomiting, dysuria, sick contacts, testicular pain or swelling. Denies alcohol or drug use. Denies past abdominal surgeries. Patient's last PO intake was one hour ago.     PAST MEDICAL HISTORY  No past medical history noted.     SURGICAL HISTORY  No past surgical history noted.    FAMILY HISTORY  No family history noted.     SOCIAL HISTORY   reports that he has never smoked. He has never used smokeless tobacco. He reports current alcohol use. He reports that he does not use drugs.    CURRENT MEDICATIONS  Previous Medications    No medications noted.      ALLERGIES  Sulfa drugs    PHYSICAL EXAM  /82   Pulse 61   Temp 36.2 °C (97.1 °F) (Temporal)   Resp 18   Ht 1.753 m (5' 9\")   Wt 85.7 kg (188 lb 15 oz)   SpO2 98%   BMI 27.90 kg/m²     Constitutional: Awake and alert  HENT: Normal inspection  Eyes: Normal inspection  Neck: Grossly normal range of motion.  Cardiovascular: Normal heart rate, Normal rhythm.  Symmetric peripheral pulses.   Thorax & Lungs: No respiratory distress, No wheezing, No rales, No rhonchi, No chest tenderness.   Abdomen: Bowel sounds normal, soft, non-distended, right lower quadrant tenderness to palpation, positive Rovsing's sign, no mass  Skin: No obvious rash.  Back: No " tenderness, No CVA tenderness.   Extremities: No clubbing, cyanosis, edema, no Homans or cords.  Neurologic: Grossly normal   Psychiatric: Normal for situation    DIAGNOSTIC STUDIES    LABS  Labs Reviewed   CBC WITH DIFFERENTIAL - Abnormal; Notable for the following components:       Result Value    MPV 8.7 (*)     All other components within normal limits   COMP METABOLIC PANEL   LIPASE   URINALYSIS   ESTIMATED GFR       RADIOLOGY/PROCEDURES   The attending emergency physician has independently interpreted the diagnostic imaging associated with this visit and am waiting the final reading from the radiologist.   My preliminary interpretation is a follows: No evidence of appendicitis, bowel obstruction, free air, other acute emergency    Radiologist interpretation:  CT-ABDOMEN-PELVIS WITH   Final Result      1.  No evidence of acute inflammatory changes in the abdomen or pelvis.          COURSE & MEDICAL DECISION MAKING     ASSESSMENT, COURSE AND PLAN  Care Narrative:     7:13 PM Patient presents to the ED with right lower quadrant abdominal pain.  Patient did have some associated fevers, he endorses some nausea but no vomiting.  No testicular symptoms, no urinary symptoms.  No rashes.  Previous abdominal surgeries.  Patient presented hemodynamically stable, afebrile.  On exam he did have tenderness to palpation in the right lower quadrant.  Patient was offered pain medication which he declined. Ordered for urinalysis, lipase, CMP, CBC w/diff, and CT-Abdomen-Pelvis with to evaluate his symptoms.     Labs reviewed, no significant leukocytosis, largely normal metabolic panel, lipase.    Urinalysis negative.    CT of the abdomen and pelvis with IV contrast showed no evidence of acute surgical emergency, no evidence of appendicitis, incarcerated strangulated hernia.  Differential diagnosis considered.  Patient presented with abdominal pain, doubt acute medical or surgical emergency.  Patient reassessed and improved,  tolerating orals.    8:23 PM - Patient was reevaluated at bedside. Discussed lab  and radiology results with the patient and informed them their imaging was normal. Patient will be discharged with a prescription for Tylenol, Motrin, and Zofran.  Patient was given the opportunity for questions which were answered appropriately.  Patient is to return to the ED for new or worsening symptoms or any additional concerns. Patient has verbalized understanding and agreement to this plan. Patient is stable for discharge at this time.              ADDITIONAL PROBLEM LIST  None      DISPOSITION AND DISCUSSIONS  I have discussed management of the patient with the following physicians and NOAM's:  None     Discussion of management with other [facility] or appropriate source(s): None     Escalation of care considered, and ultimately not performed: acute inpatient care management, however at this time, the patient is most appropriate for outpatient management.    Barriers to care at this time, including but not limited to: Patient does not have established PCP.     Decision tools and prescription drugs considered including, but not limited to: Analgesics or anti-inflammatories.    DISPOSITION:  Patient will be discharged home in stable condition.    FOLLOW UP:  [facility] - Primary Care and Family Medicine  [address] [place] [id number]  [phone]          OUTPATIENT MEDICATIONS:  Discharge Medication List as of 12/31/2024  9:38 PM        START taking these medications    Details   acetaminophen (TYLENOL) 325 MG Tab Take 2 Tablets by mouth every 6 hours as needed for Mild Pain., Disp-30 Tablet, R-0, Normal      ibuprofen (MOTRIN) 400 MG Tab Take 1 Tablet by mouth every 6 hours as needed for Moderate Pain., Disp-30 Tablet, R-0, Normal      ondansetron (ZOFRAN ODT) 4 MG TABLET DISPERSIBLE Take 1 Tablet by mouth every 6 hours as needed for Nausea/Vomiting., Disp-10 Tablet, R-0, Normal           FINAL  DIAGNOSIS  1. Right lower quadrant abdominal pain           The note accurately reflects work and decisions made by me.  Carlos Park M.D.  1/1/2025  12:02 AM

## 2025-01-01 NOTE — ED NOTES
Chief Complaint   Patient presents with    Abdominal Pain     Right lower x3days    Fever    Pt ambulated to triage sent from Urgent care for further eval of right lower abd pain x3days. Pt reports fever , denies n/v/d.

## 2025-01-01 NOTE — ED NOTES
Pt ambulatory with steady gait from [facility] room. Labs collected in triage. Call light provided. VSS.

## 2025-07-22 ENCOUNTER — TELEPHONE (OUTPATIENT)
Dept: HEALTH INFORMATION MANAGEMENT | Facility: OTHER | Age: 35
End: 2025-07-22
Payer: COMMERCIAL